# Patient Record
Sex: MALE | Race: WHITE | Employment: UNEMPLOYED | URBAN - METROPOLITAN AREA
[De-identification: names, ages, dates, MRNs, and addresses within clinical notes are randomized per-mention and may not be internally consistent; named-entity substitution may affect disease eponyms.]

---

## 2017-06-06 ENCOUNTER — HOSPITAL ENCOUNTER (EMERGENCY)
Age: 27
Discharge: HOME | End: 2017-06-06
Attending: EMERGENCY MEDICINE
Payer: MEDICARE

## 2017-06-06 VITALS
HEIGHT: 68 IN | WEIGHT: 144 LBS | SYSTOLIC BLOOD PRESSURE: 131 MMHG | OXYGEN SATURATION: 100 % | HEART RATE: 120 BPM | BODY MASS INDEX: 21.82 KG/M2 | RESPIRATION RATE: 20 BRPM | TEMPERATURE: 97.3 F | DIASTOLIC BLOOD PRESSURE: 83 MMHG

## 2017-06-06 DIAGNOSIS — K11.7 XEROSTOMIA: ICD-10-CM

## 2017-06-06 DIAGNOSIS — R13.10 DYSPHAGIA, UNSPECIFIED TYPE: Primary | ICD-10-CM

## 2017-06-06 DIAGNOSIS — F25.9 SCHIZOAFFECTIVE DISORDER, UNSPECIFIED TYPE (HCC): ICD-10-CM

## 2017-06-06 PROCEDURE — 99282 EMERGENCY DEPT VISIT SF MDM: CPT

## 2017-06-06 RX ORDER — SUCRALFATE 1 G/10ML
1 SUSPENSION ORAL 4 TIMES DAILY
Qty: 414 ML | Refills: 0 | Status: SHIPPED | OUTPATIENT
Start: 2017-06-06

## 2017-06-06 RX ORDER — OMEPRAZOLE 40 MG/1
40 CAPSULE, DELAYED RELEASE ORAL DAILY
Qty: 14 CAP | Refills: 0 | Status: SHIPPED | OUTPATIENT
Start: 2017-06-06 | End: 2017-06-20

## 2017-06-07 NOTE — ED NOTES
I have reviewed discharge instructions with the patient. The patient verbalized understanding.   Patient armband removed and shredded, script x 1 given, scripts x 2 sent to pharmacy and verified with pt

## 2017-06-07 NOTE — DISCHARGE INSTRUCTIONS
Dry Mouth: Care Instructions  Your Care Instructions    When you have dry mouth, or xerostomia (say \"zee-ruh-STO-elizabeth-uh\"), your mouth does not make enough saliva. Saliva helps you chew, swallow, and digest your food. It also neutralizes the acids that form in your mouth. If you have ongoing dry mouth, it can lead to mouth infections, gum disease, and tooth decay. It can also make it hard to swallow or talk. Your doctor or dentist may diagnose dry mouth. It is often a side effect of medicines like diuretics, decongestants, and antidepressants. Cancer treatments or damage to the salivary glands can also cause dry mouth. To help fight the effects of dry mouth, your dentist may apply extra fluoride to your teeth. This can help prevent tooth decay. He or she may also give you mouthwash to fight bacteria. You may need to have dental checkups more often. Treatment may include medicine to help you make more saliva. Or, if other medicines you take are making your mouth dry, your doctor may change the type or dosage of the medicine. Follow-up care is a key part of your treatment and safety. Be sure to make and go to all appointments, and call your doctor if you are having problems. It's also a good idea to know your test results and keep a list of the medicines you take. How can you care for yourself at home? · Take frequent sips of liquid throughout the day. Water is best.  · Use ice chips, sugar-free candy, or gum to help keep your mouth moist. (Candy or gum sweetened with xylitol can help prevent tooth decay.)  · Avoid spicy or salty foods. They may cause pain in a dry mouth. · Brush your teeth twice a day, morning and night. Floss once a day. · Schedule checkups and cleanings as often as your dentist recommends it. · Use an over-the-counter saliva substitute. · Avoid caffeine, tobacco, and alcohol. They can also make your mouth dry. When should you call for help?   Watch closely for changes in your health, and be sure to contact your doctor if:  · You do not get better as expected. Where can you learn more? Go to http://pina-sandy.info/. Enter 21 [id number] in the search box to learn more about \"Dry Mouth: Care Instructions. \"  Current as of: August 9, 2016  Content Version: 11.2  © 6642-8248 [employer]. Care instructions adapted under license by [employer] (which disclaims liability or warranty for this information). If you have questions about a medical condition or this instruction, always ask your healthcare professional. [place] [address] any warranty or liability for your use of this information. Learning About Swallowing Problems  What are swallowing problems? Certain health problems that affect the nervous system can cause trouble swallowing. These conditions include stroke, ALS (also known as Helene Gehrig's disease), Parkinson's disease, and multiple sclerosis. The muscles and nerves that help move food through the throat and esophagus may not work right. Growths, such as cancer, and other problems with your esophagus can also make it hard to swallow. The esophagus is the tube that leads from your throat to your stomach. How are swallowing problems diagnosed? A doctor or speech therapist will examine you to check for swallowing problems. You may get swallowing tests to check how well your throat muscles work. For these tests, you swallow a special liquid that helps the doctor see your throat and esophagus on an X-ray or video screen. Other tests use a thin, flexible tube called a scope to check for problems with your esophagus. The doctor puts the scope in your mouth and down your throat to look at your esophagus. What are the symptoms? Symptoms of swallowing problems may include:  · Trouble getting food or liquids to go down on the first try. · Gagging, choking, or coughing when you swallow.   · Having food or liquids come back up through your throat, mouth, or nose after you swallow. · Feeling like foods or liquids are stuck in some part of your throat or chest.  · Pain when you swallow. How are swallowing problems treated? How swallowing problems are treated depends on the cause. The main goals of treatment will be to help you eat and swallow safely and get good nutrition. This is important for your health and quality of life. You may learn exercises to train your throat muscles to work together so you're able to swallow better. Learning certain ways to put food in your mouth or to position your head while eating may also help. Your doctor or a speech therapist may recommend changes to your diet to help make it easier to swallow. You may need to avoid certain foods or liquids. You also may need to change the thickness of foods or liquids in your diet. To eat and swallow safely, follow any instructions you get from your doctor or therapist. These ideas may help:  · Sit upright when eating, drinking, and taking pills. · Take small bites of food. Chew completely and swallow before taking another bite. · Take small sips of liquids. Hold the liquid in your mouth as you prepare to swallow. · If eating makes you tired, eat smaller but more frequent meals. · If you cough or choke, lean forward and keep your chin tipped downward while you cough. Where can you learn more? Go to http://pina-sandy.info/. Enter 433 [id number] in the search box to learn more about \"Learning About Swallowing Problems. \"  Current as of: May 27, 2016  Content Version: 11.2  © 8606-2549 [employer]. Care instructions adapted under license by [employer] (which disclaims liability or warranty for this information). If you have questions about a medical condition or this instruction, always ask your healthcare professional. [place] [address] any warranty or liability for your use of this information.

## 2017-06-07 NOTE — ED PROVIDER NOTES
HPI Comments: 9:35 PM    Margaret August is a 27 y.o. male with pertinent PMHx of schizophrenia presenting ambulatory to the ED c/o dysphagia x months. Pt states that he is currently coming off of his psychiatric medication, due to complications with this medication. Pt has been taking a small amount of Risperdal daily for ~1.5 years, but has been off of it for the last 5 days. Pt notes associated symptoms of fever/chills and recent cold/cough symptoms. Pt's family members state that he has lost ~20 pounds since onset of his dysphagia. Pt states that he has been able to drink meal supplements, but is unable to eat anything solid. Pt specifically denies any appetite changes or ear pain. PCP: None  Psychiatrist: Nereyda Rolle MD  Social Hx: + tobacco use (pk/day), - alcohol use, - illicit drug use    There are no other complaints, changes, or physical findings at this time. The history is provided by the patient and a relative. No  was used. Past Medical History:   Diagnosis Date    Psychiatric disorder     schizophrenia       History reviewed. No pertinent surgical history. History reviewed. No pertinent family history. Social History     Social History    Marital status: SINGLE     Spouse name: N/A    Number of children: N/A    Years of education: N/A     Occupational History    Not on file. Social History Main Topics    Smoking status: Current Every Day Smoker     Packs/day: 1.00    Smokeless tobacco: Not on file    Alcohol use No    Drug use: No    Sexual activity: Not on file     Other Topics Concern    Not on file     Social History Narrative    No narrative on file         ALLERGIES: Review of patient's allergies indicates no known allergies. Review of Systems   Constitutional: Positive for chills, fever and unexpected weight change (wt loss). Negative for appetite change. HENT: Positive for rhinorrhea and trouble swallowing.  Negative for ear pain.    Respiratory: Positive for cough. All other systems reviewed and are negative. Vitals:    06/06/17 2054   BP: 131/83   Pulse: (!) 120   Resp: 20   Temp: 97.3 °F (36.3 °C)   SpO2: 100%   Weight: 65.3 kg (144 lb)   Height: 5' 8\" (1.727 m)            Physical Exam   Constitutional: He is oriented to person, place, and time. He appears well-developed and well-nourished. No distress. HENT:   Head: Normocephalic and atraumatic. Eyes: EOM are normal. Pupils are equal, round, and reactive to light. Neck: Normal range of motion. Neck supple. No visible or palpable neck mass to anterior or posterior cervical triangle. No stridor. No tracheal shift. Cardiovascular: Normal rate, normal heart sounds and intact distal pulses. Pulmonary/Chest: Effort normal and breath sounds normal. No respiratory distress. Abdominal: Soft. Bowel sounds are normal. He exhibits no distension. There is no tenderness. Musculoskeletal: Normal range of motion. He exhibits no edema or tenderness. Neurological: He is alert and oriented to person, place, and time. Skin: Skin is warm and dry. No rash noted. Psychiatric:   fidgety, mildly agitated, constantly rolling fingers. Still comfortable appearing. Nursing note and vitals reviewed. RESULTS:    CARDIAC MONITOR NOTE:  Cardiac Rhythm: sinus tachycardia  Rate: 120 bpm     PULSE OXIMETRY NOTE:  Pulse-ox is 100% on RA  Interpretation: NML       XR UGI/BA SWALLOW W SM BOWEL    (Results Pending)        Labs Reviewed - No data to display    No results found for this or any previous visit (from the past 12 hour(s)). MDM  Number of Diagnoses or Management Options  Diagnosis management comments: Most likely medication reaction, though obstructive mass or motility. Will order outpatient swallowing study to best address these issues. I will follow up with them on the phone.          Amount and/or Complexity of Data Reviewed  Obtain history from someone other than the patient: yes (Family members)      ED Course     Medications - No data to display    Procedures    PROGRESS NOTE:  9:35 PM  Initial assessment performed. Written by Adi Neal ED Scribe, as dictated by Sue Hu MD.    DISCHARGE NOTE:  10:31 PM  The patient is ready for discharge. The patient's signs, symptoms, diagnosis, and discharge instructions have been discussed and the patient and/or family has conveyed their understanding. The patient and/or family is to follow up as recommended or return to the ER should their symptoms worsen. Plan has been discussed and the patient and/or family is in agreement. Written by Daysi Denney ED Scribe, as dictated by Sue Hu MD.     CLINICAL IMPRESSION:    1. Dysphagia, unspecified type    2. Xerostomia    3. Schizoaffective disorder, unspecified type ([facility] [address]        PLAN:  1. D/C Home  2. Current Discharge Medication List      START taking these medications    Details   sucralfate (CARAFATE) 100 mg/mL suspension Take 10 mL by mouth four (4) times daily. Qty: 414 mL, Refills: 0      omeprazole (PRILOSEC) 40 mg capsule Take 1 Cap by mouth daily for 14 days. Qty: 14 Cap, Refills: 0           3. Follow-up Information     Follow up With Details Comments Contact Bhavana Taylor MD Schedule an appointment as soon as possible for a visit for GI follow up and swallowing study as discussed [address] [place] [address] [address] [place] [address]      Lora Farah NP Schedule an appointment as soon as possible for a visit for psychiatric follow up [address] [place] [address] EMERGENCY DEPT  As needed, If symptoms worsen [address] [facility]  [phone]          SCRIBE ATTESTATION:  This note is prepared by Adi Neal, acting as Scribe for [employer]. Alex Hu MD.    PROVIDER ATTESTATION:  [employer].  Alex Hu MD: The scribe's documentation has been prepared under my direction and personally reviewed by me in its entirety. I confirm that the note above accurately reflects all work, treatment, procedures, and medical decision making performed by me.

## 2023-06-14 NOTE — ED TRIAGE NOTES
Pt w/ hx schizophrenia - reports trouble swallowing a few weeks ago - able to get ensure down - trouble swallowing foods. Pt's family thinks he may be having a reaction to his medication.
Sepsis Screening completed    (  )Patient meets SIRS criteria. (x  )Patient does not meet SIRS criteria.       SIRS Criteria is achieved when two or more of the following are present   Temperature < 96.8°F (36°C) or > 100.9°F (38.3°C)   Heart Rate > 90 beats per minute   Respiratory Rate > 20 breaths per minute   WBC count > 12,000 or <4,000 or > 10% bands
none